# Patient Record
Sex: FEMALE | URBAN - METROPOLITAN AREA
[De-identification: names, ages, dates, MRNs, and addresses within clinical notes are randomized per-mention and may not be internally consistent; named-entity substitution may affect disease eponyms.]

---

## 2020-10-26 ENCOUNTER — OFFICE VISIT (OUTPATIENT)
Dept: URBAN - METROPOLITAN AREA CLINIC 29 | Facility: CLINIC | Age: 40
End: 2020-10-26
Payer: COMMERCIAL

## 2020-10-26 DIAGNOSIS — R51.9 HEADACHE, UNSPECIFIED: Primary | ICD-10-CM

## 2020-10-26 PROCEDURE — 92004 COMPRE OPH EXAM NEW PT 1/>: CPT | Performed by: OPTOMETRIST

## 2020-10-26 PROCEDURE — 92133 CPTRZD OPH DX IMG PST SGM ON: CPT | Performed by: OPTOMETRIST

## 2020-10-26 RX ORDER — ACETAMINOPHEN/DIPHENHYDRAMINE 500MG-25MG
TABLET ORAL
Qty: 0 | Refills: 0 | Status: INACTIVE
Start: 2020-10-26 | End: 2020-10-26

## 2020-10-26 ASSESSMENT — INTRAOCULAR PRESSURE
OD: 14
OS: 13

## 2020-10-26 NOTE — IMPRESSION/PLAN
Impression: Headache, unspecified: R51.9. OCT: normal RNFL OU. OPTOS: no visible retinopathy or papilledema OU. Plan: Pt reports frequent headaches, discussed condition. Exam shows no findings of papilledema or other optic nerve abnormalities. Vision is excellent as well. Recommend continued care w/neurology, and RTC if any change in vision occurs.